# Patient Record
Sex: FEMALE | Race: WHITE
[De-identification: names, ages, dates, MRNs, and addresses within clinical notes are randomized per-mention and may not be internally consistent; named-entity substitution may affect disease eponyms.]

---

## 2023-01-24 ENCOUNTER — HOSPITAL ENCOUNTER (OUTPATIENT)
Dept: HOSPITAL 95 - ORSCSDS | Age: 68
Discharge: HOME | End: 2023-01-24
Attending: OPHTHALMOLOGY
Payer: MEDICARE

## 2023-01-24 VITALS — HEIGHT: 65 IN | WEIGHT: 183.42 LBS | BODY MASS INDEX: 30.56 KG/M2

## 2023-01-24 DIAGNOSIS — Z79.84: ICD-10-CM

## 2023-01-24 DIAGNOSIS — Z79.899: ICD-10-CM

## 2023-01-24 DIAGNOSIS — Z85.850: ICD-10-CM

## 2023-01-24 DIAGNOSIS — Z79.01: ICD-10-CM

## 2023-01-24 DIAGNOSIS — H25.12: Primary | ICD-10-CM

## 2023-01-24 DIAGNOSIS — E66.9: ICD-10-CM

## 2023-01-24 DIAGNOSIS — I10: ICD-10-CM

## 2023-01-24 PROCEDURE — V2632 POST CHMBR INTRAOCULAR LENS: HCPCS

## 2023-01-24 PROCEDURE — 08DK3ZZ EXTRACTION OF LEFT LENS, PERCUTANEOUS APPROACH: ICD-10-PCS | Performed by: OPHTHALMOLOGY

## 2023-01-24 NOTE — NUR
01/24/23 1559 Gilbert Amado
PT REPORTED FLOATERS IN HER EYES, BUT SAID THE SAME AMOUNT OF
FLOATERS HAD BEEN THERE
PRIOR TO SURGERY. PT WAS ADVISED TO CONTACT DR. PALMER IF SHE
EXPERIENCED A SUDDEN NEW BURST OF FLOATERS. PT WAS HYPERTENSIVE IN
STEP DOWN (SEE VITALS RECORDS). HOWEVER, BP'S WERE LOWER THAN PRE-OP.
PT WAS INSTRUCTED TO FOLLOW UP WITH PCP REGARDING HYPERTENSION ASAP.

## 2023-02-02 ENCOUNTER — HOSPITAL ENCOUNTER (OUTPATIENT)
Dept: HOSPITAL 95 - ORSCSDS | Age: 68
Discharge: HOME | End: 2023-02-02
Attending: OPHTHALMOLOGY
Payer: MEDICARE

## 2023-02-02 VITALS — WEIGHT: 181.88 LBS | HEIGHT: 66 IN | BODY MASS INDEX: 29.23 KG/M2

## 2023-02-02 DIAGNOSIS — Z79.01: ICD-10-CM

## 2023-02-02 DIAGNOSIS — Z85.850: ICD-10-CM

## 2023-02-02 DIAGNOSIS — H25.11: Primary | ICD-10-CM

## 2023-02-02 DIAGNOSIS — I10: ICD-10-CM

## 2023-02-02 DIAGNOSIS — Z79.899: ICD-10-CM

## 2023-02-02 PROCEDURE — V2632 POST CHMBR INTRAOCULAR LENS: HCPCS

## 2023-02-02 PROCEDURE — 08RJ3JZ REPLACEMENT OF RIGHT LENS WITH SYNTHETIC SUBSTITUTE, PERCUTANEOUS APPROACH: ICD-10-PCS | Performed by: OPHTHALMOLOGY
